# Patient Record
Sex: FEMALE | Race: OTHER | HISPANIC OR LATINO
[De-identification: names, ages, dates, MRNs, and addresses within clinical notes are randomized per-mention and may not be internally consistent; named-entity substitution may affect disease eponyms.]

---
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
## 2023-08-14 ENCOUNTER — OUTPATIENT (OUTPATIENT)
Dept: OUTPATIENT SERVICES | Facility: HOSPITAL | Age: 18
LOS: 1 days | End: 2023-08-14

## 2023-08-14 ENCOUNTER — EMERGENCY (EMERGENCY)
Facility: HOSPITAL | Age: 18
LOS: 1 days | Discharge: HOME | End: 2023-08-14
Attending: STUDENT IN AN ORGANIZED HEALTH CARE EDUCATION/TRAINING PROGRAM
Payer: MEDICAID

## 2023-08-14 VITALS
HEIGHT: 65 IN | WEIGHT: 214.07 LBS | OXYGEN SATURATION: 99 % | DIASTOLIC BLOOD PRESSURE: 82 MMHG | TEMPERATURE: 99 F | HEART RATE: 108 BPM | RESPIRATION RATE: 18 BRPM | SYSTOLIC BLOOD PRESSURE: 132 MMHG

## 2023-08-14 DIAGNOSIS — I10 ESSENTIAL (PRIMARY) HYPERTENSION: ICD-10-CM

## 2023-08-14 DIAGNOSIS — Z90.49 ACQUIRED ABSENCE OF OTHER SPECIFIED PARTS OF DIGESTIVE TRACT: Chronic | ICD-10-CM

## 2023-08-14 LAB — GAS PNL BLDV: SIGNIFICANT CHANGE UP

## 2023-08-14 PROCEDURE — 99291 CRITICAL CARE FIRST HOUR: CPT

## 2023-08-14 RX ORDER — SODIUM CHLORIDE 9 MG/ML
1000 INJECTION INTRAMUSCULAR; INTRAVENOUS; SUBCUTANEOUS ONCE
Refills: 0 | Status: COMPLETED | OUTPATIENT
Start: 2023-08-14 | End: 2023-08-14

## 2023-08-14 RX ADMIN — SODIUM CHLORIDE 1000 MILLILITER(S): 9 INJECTION INTRAMUSCULAR; INTRAVENOUS; SUBCUTANEOUS at 23:59

## 2023-08-15 VITALS
SYSTOLIC BLOOD PRESSURE: 127 MMHG | TEMPERATURE: 98 F | DIASTOLIC BLOOD PRESSURE: 67 MMHG | RESPIRATION RATE: 16 BRPM | HEART RATE: 80 BPM | OXYGEN SATURATION: 100 %

## 2023-08-15 LAB
ALBUMIN SERPL ELPH-MCNC: 4.8 G/DL — SIGNIFICANT CHANGE UP (ref 3.3–5)
ALP SERPL-CCNC: 98 U/L — SIGNIFICANT CHANGE UP (ref 40–120)
ALT FLD-CCNC: 16 U/L — SIGNIFICANT CHANGE UP (ref 10–45)
ANION GAP SERPL CALC-SCNC: 13 MMOL/L — SIGNIFICANT CHANGE UP (ref 5–17)
APPEARANCE UR: CLEAR — SIGNIFICANT CHANGE UP
AST SERPL-CCNC: 13 U/L — SIGNIFICANT CHANGE UP (ref 10–40)
BACTERIA # UR AUTO: NEGATIVE — SIGNIFICANT CHANGE UP
BASE EXCESS BLDV CALC-SCNC: 1 MMOL/L — SIGNIFICANT CHANGE UP (ref -2–3)
BASOPHILS # BLD AUTO: 0.03 K/UL — SIGNIFICANT CHANGE UP (ref 0–0.2)
BASOPHILS NFR BLD AUTO: 0.3 % — SIGNIFICANT CHANGE UP (ref 0–2)
BILIRUB SERPL-MCNC: 0.5 MG/DL — SIGNIFICANT CHANGE UP (ref 0.2–1.2)
BILIRUB UR-MCNC: NEGATIVE — SIGNIFICANT CHANGE UP
BUN SERPL-MCNC: 11 MG/DL — SIGNIFICANT CHANGE UP (ref 7–23)
CA-I SERPL-SCNC: 1.31 MMOL/L — SIGNIFICANT CHANGE UP (ref 1.15–1.33)
CALCIUM SERPL-MCNC: 9.8 MG/DL — SIGNIFICANT CHANGE UP (ref 8.4–10.5)
CHLORIDE BLDV-SCNC: 105 MMOL/L — SIGNIFICANT CHANGE UP (ref 96–108)
CHLORIDE SERPL-SCNC: 104 MMOL/L — SIGNIFICANT CHANGE UP (ref 96–108)
CO2 BLDV-SCNC: 28 MMOL/L — HIGH (ref 22–26)
CO2 SERPL-SCNC: 23 MMOL/L — SIGNIFICANT CHANGE UP (ref 22–31)
COLOR SPEC: SIGNIFICANT CHANGE UP
CREAT SERPL-MCNC: 0.61 MG/DL — SIGNIFICANT CHANGE UP (ref 0.5–1.3)
DIFF PNL FLD: ABNORMAL
EGFR: 132 ML/MIN/1.73M2 — SIGNIFICANT CHANGE UP
EOSINOPHIL # BLD AUTO: 0.1 K/UL — SIGNIFICANT CHANGE UP (ref 0–0.5)
EOSINOPHIL NFR BLD AUTO: 1 % — SIGNIFICANT CHANGE UP (ref 0–6)
EPI CELLS # UR: 0 /HPF — SIGNIFICANT CHANGE UP
GAS PNL BLDV: 140 MMOL/L — SIGNIFICANT CHANGE UP (ref 136–145)
GAS PNL BLDV: SIGNIFICANT CHANGE UP
GLUCOSE BLDV-MCNC: 102 MG/DL — HIGH (ref 70–99)
GLUCOSE SERPL-MCNC: 102 MG/DL — HIGH (ref 70–99)
GLUCOSE UR QL: NEGATIVE — SIGNIFICANT CHANGE UP
HCG SERPL-ACNC: <2 MIU/ML — SIGNIFICANT CHANGE UP
HCO3 BLDV-SCNC: 27 MMOL/L — SIGNIFICANT CHANGE UP (ref 22–29)
HCT VFR BLD CALC: 24.7 % — LOW (ref 34.5–45)
HCT VFR BLDA CALC: 19 % — CRITICAL LOW (ref 34.5–46.5)
HGB BLD CALC-MCNC: 6.4 G/DL — CRITICAL LOW (ref 11.7–16.1)
HGB BLD-MCNC: 6.5 G/DL — CRITICAL LOW (ref 11.5–15.5)
HYALINE CASTS # UR AUTO: 0 /LPF — SIGNIFICANT CHANGE UP (ref 0–2)
IMM GRANULOCYTES NFR BLD AUTO: 0.6 % — SIGNIFICANT CHANGE UP (ref 0–0.9)
KETONES UR-MCNC: NEGATIVE — SIGNIFICANT CHANGE UP
LACTATE BLDV-MCNC: 1.2 MMOL/L — SIGNIFICANT CHANGE UP (ref 0.5–2)
LEUKOCYTE ESTERASE UR-ACNC: NEGATIVE — SIGNIFICANT CHANGE UP
LIDOCAIN IGE QN: 35 U/L — SIGNIFICANT CHANGE UP (ref 7–60)
LYMPHOCYTES # BLD AUTO: 2.48 K/UL — SIGNIFICANT CHANGE UP (ref 1–3.3)
LYMPHOCYTES # BLD AUTO: 25.6 % — SIGNIFICANT CHANGE UP (ref 13–44)
MCHC RBC-ENTMCNC: 14.5 PG — LOW (ref 27–34)
MCHC RBC-ENTMCNC: 26.3 GM/DL — LOW (ref 32–36)
MCV RBC AUTO: 55.1 FL — LOW (ref 80–100)
MONOCYTES # BLD AUTO: 0.65 K/UL — SIGNIFICANT CHANGE UP (ref 0–0.9)
MONOCYTES NFR BLD AUTO: 6.7 % — SIGNIFICANT CHANGE UP (ref 2–14)
NEUTROPHILS # BLD AUTO: 6.35 K/UL — SIGNIFICANT CHANGE UP (ref 1.8–7.4)
NEUTROPHILS NFR BLD AUTO: 65.8 % — SIGNIFICANT CHANGE UP (ref 43–77)
NITRITE UR-MCNC: NEGATIVE — SIGNIFICANT CHANGE UP
NRBC # BLD: 0 /100 WBCS — SIGNIFICANT CHANGE UP (ref 0–0)
PCO2 BLDV: 47 MMHG — HIGH (ref 39–42)
PH BLDV: 7.36 — SIGNIFICANT CHANGE UP (ref 7.32–7.43)
PH UR: 6.5 — SIGNIFICANT CHANGE UP (ref 5–8)
PLATELET # BLD AUTO: 388 K/UL — SIGNIFICANT CHANGE UP (ref 150–400)
PO2 BLDV: 28 MMHG — SIGNIFICANT CHANGE UP (ref 25–45)
POTASSIUM BLDV-SCNC: 4.2 MMOL/L — SIGNIFICANT CHANGE UP (ref 3.5–5.1)
POTASSIUM SERPL-MCNC: 4.1 MMOL/L — SIGNIFICANT CHANGE UP (ref 3.5–5.3)
POTASSIUM SERPL-SCNC: 4.1 MMOL/L — SIGNIFICANT CHANGE UP (ref 3.5–5.3)
PROT SERPL-MCNC: 7.7 G/DL — SIGNIFICANT CHANGE UP (ref 6–8.3)
PROT UR-MCNC: NEGATIVE — SIGNIFICANT CHANGE UP
RBC # BLD: 4.48 M/UL — SIGNIFICANT CHANGE UP (ref 3.8–5.2)
RBC # FLD: 23.2 % — HIGH (ref 10.3–14.5)
RBC CASTS # UR COMP ASSIST: 7 /HPF — HIGH (ref 0–4)
SAO2 % BLDV: 42.5 % — LOW (ref 67–88)
SODIUM SERPL-SCNC: 140 MMOL/L — SIGNIFICANT CHANGE UP (ref 135–145)
SP GR SPEC: 1.02 — SIGNIFICANT CHANGE UP (ref 1.01–1.02)
UROBILINOGEN FLD QL: NEGATIVE — SIGNIFICANT CHANGE UP
WBC # BLD: 9.67 K/UL — SIGNIFICANT CHANGE UP (ref 3.8–10.5)
WBC # FLD AUTO: 9.67 K/UL — SIGNIFICANT CHANGE UP (ref 3.8–10.5)
WBC UR QL: 0 /HPF — SIGNIFICANT CHANGE UP (ref 0–5)

## 2023-08-15 PROCEDURE — 83690 ASSAY OF LIPASE: CPT

## 2023-08-15 PROCEDURE — 86850 RBC ANTIBODY SCREEN: CPT

## 2023-08-15 PROCEDURE — 87086 URINE CULTURE/COLONY COUNT: CPT

## 2023-08-15 PROCEDURE — 86923 COMPATIBILITY TEST ELECTRIC: CPT

## 2023-08-15 PROCEDURE — 84295 ASSAY OF SERUM SODIUM: CPT

## 2023-08-15 PROCEDURE — 84132 ASSAY OF SERUM POTASSIUM: CPT

## 2023-08-15 PROCEDURE — 76856 US EXAM PELVIC COMPLETE: CPT

## 2023-08-15 PROCEDURE — 85014 HEMATOCRIT: CPT

## 2023-08-15 PROCEDURE — 82947 ASSAY GLUCOSE BLOOD QUANT: CPT

## 2023-08-15 PROCEDURE — P9016: CPT

## 2023-08-15 PROCEDURE — 86900 BLOOD TYPING SEROLOGIC ABO: CPT

## 2023-08-15 PROCEDURE — 83605 ASSAY OF LACTIC ACID: CPT

## 2023-08-15 PROCEDURE — 81001 URINALYSIS AUTO W/SCOPE: CPT

## 2023-08-15 PROCEDURE — 84702 CHORIONIC GONADOTROPIN TEST: CPT

## 2023-08-15 PROCEDURE — 93005 ELECTROCARDIOGRAM TRACING: CPT

## 2023-08-15 PROCEDURE — 82803 BLOOD GASES ANY COMBINATION: CPT

## 2023-08-15 PROCEDURE — 85025 COMPLETE CBC W/AUTO DIFF WBC: CPT

## 2023-08-15 PROCEDURE — 86901 BLOOD TYPING SEROLOGIC RH(D): CPT

## 2023-08-15 PROCEDURE — 85018 HEMOGLOBIN: CPT

## 2023-08-15 PROCEDURE — 76856 US EXAM PELVIC COMPLETE: CPT | Mod: 26

## 2023-08-15 PROCEDURE — 82435 ASSAY OF BLOOD CHLORIDE: CPT

## 2023-08-15 PROCEDURE — 80053 COMPREHEN METABOLIC PANEL: CPT

## 2023-08-15 PROCEDURE — 99291 CRITICAL CARE FIRST HOUR: CPT | Mod: 25

## 2023-08-15 PROCEDURE — 82330 ASSAY OF CALCIUM: CPT

## 2023-08-15 NOTE — ED PROVIDER NOTE - PATIENT PORTAL LINK FT
You can access the FollowMyHealth Patient Portal offered by [facility] by registering at the following website: [email or web address]. By joining [employer]’s FollowMyHealth portal, you will also be able to view your health information using other applications (apps) compatible with our system.

## 2023-08-15 NOTE — ED ADULT NURSE NOTE - NSFALLUNIVINTERV_ED_ALL_ED
Bed/Stretcher in lowest position, wheels locked, appropriate side rails in place/Call bell, personal items and telephone in reach/Instruct patient to call for assistance before getting out of bed/chair/stretcher/Non-slip footwear applied when patient is off stretcher/Ennice to call system/Physically safe environment - no spills, clutter or unnecessary equipment/Purposeful proactive rounding/Room/bathroom lighting operational, light cord in reach

## 2023-08-15 NOTE — ED ADULT NURSE NOTE - OBJECTIVE STATEMENT
17y F A&Ox4 C/o Low H&H. Pt states for the last 8 months she has had vaginal bleeding, changing about 5 soiled pads daily. She was unable to see a doctor due to lack of insurance. Yesterday pt had blood work taken with new insured physician and received a call at 5pm with the referral to come to the ED for evaluation due to a Hemoglobin of 6. Additionally, pt states she has been feeling dyspnea on exertion and intermittent mild headaches. No uncontrolled bleeding noted. Denies any Fever/cough/chills, N/V/D/Cp/SOB/Gi/Gu symptoms. No PMH. PSH of appendectomy. VSS

## 2023-08-15 NOTE — CONSULT NOTE ADULT - SUBJECTIVE AND OBJECTIVE BOX
GYN Consult Note    16yo G0 with no significant PMHx presents for abnormal uterine bleeding and symptomatic anemia. Patient reports LMP started 9 months ago and she has bled every day since. The bleeding alternates between light (spotting) and moderate-heavy (changing pad every hour). Abnormal bleeding of this pattern has been going on since menarche at 9 years old. She has felt dizziness/lightheadedness and fatigue off/on (approx once per week) for the past two years in the setting of this bleeding. Patient saw an Ob/Gyn 2 years ago and was prescribed OCPs that she self-discontinued after 4 months due to nausea and report that she continued to bleed daily despite the medication. Patient reports normal pelvic sono at that time. Per chart review patient was seen in the [facility] ED for similar symptoms in  and given pRBC transfusion. TAUS at that time was unremarkable. Patient saw her PCP today and gave this history; labs in the office revealed Hgb 6.6, prompting PCP to send patient to ED. Currently patient denies dizziness, lightheadedness, SOB, CP, palpitations, fatigue. Her bleeding is light today.     OBHx: G0  GYNHx: AUB  PMHx: denies (specifically denies migraines, HTN, VTE)  PSHx: appendectomy  Meds: none  All: NKDA  Social: denies tobacco use      REVIEW OF SYSTEMS  General: denies fevers, chills, tiredness  Skin/Breast: denies breast pain  Respiratory and Thorax: denies shortness of breath, denies cough  Cardiovascular: denies chest pain and denies palpitations  Gastrointestinal: denies abdominal pain, nausea/ vomiting	  Genitourinary: denies dysuria, increased urinary frequency, urgency	  Constitutional, Cardiovascular, Respiratory, Gastrointestinal, Genitourinary, Musculoskeletal and Integumentary review of systems are normal except as noted. 	      Vital Signs Last 24 Hrs  T(C): 36.9 (15 Aug 2023 03:10), Max: 37.2 (14 Aug 2023 18:31)  T(F): 98.4 (15 Aug 2023 03:10), Max: 98.9 (14 Aug 2023 18:31)  HR: 92 (15 Aug 2023 03:10) (92 - 108)  BP: 134/68 (15 Aug 2023 03:10) (125/71 - 147/78)  BP(mean): 95 (15 Aug 2023 00:13) (95 - 95)  RR: 16 (15 Aug 2023 03:10) (16 - 18)  SpO2: 100% (15 Aug 2023 03:10) (99% - 100%)    Parameters below as of 15 Aug 2023 03:10  Patient On (Oxygen Delivery Method): room air        PHYSICAL EXAM:   Gen: NAD   Cardiovascular: Clinically well perfused  Respiratory: Breathing comfortably on RA  Abd: Soft, non-tender, non-distended  Pelvic: PATIENT DECLINES. Per ED resident, minimal blood in vagina and no active bleeding from cervical os on prior exam. Pad examined - 10% saturated after approx 5 hours.  Neuro: AOx3      LABS:                        6.5    9.67  )-----------( 388      ( 14 Aug 2023 23:54 )             24.7     08-14    140  |  104  |  11  ----------------------------<  102<H>  4.1   |  23  |  0.61    Ca    9.8      14 Aug 2023 23:54    TPro  7.7  /  Alb  4.8  /  TBili  0.5  /  DBili  x   /  AST  13  /  ALT  16  /  AlkPhos  98  08-14      Urinalysis Basic - ( 15 Aug 2023 02:58 )    Color: Light Yellow / Appearance: Clear / S.016 / pH: x  Gluc: x / Ketone: Negative  / Bili: Negative / Urobili: Negative   Blood: x / Protein: Negative / Nitrite: Negative   Leuk Esterase: Negative / RBC: 7 /hpf / WBC 0 /HPF   Sq Epi: x / Non Sq Epi: x / Bacteria: Negative        RADIOLOGY & ADDITIONAL STUDIES:  < from: US Pelvis Complete (08.15.23 @ 01:54) >  ACC: [id number] EXAM:  US PELVIC COMPLETE   ORDERED BY: MOHSEN CHRISTIAN     PROCEDURE DATE:  08/15/2023          INTERPRETATION:  CLINICAL INFORMATION: Vaginal bleeding with anemia.   Ongoing vaginal bleeding for 8 months.    LMP: Unknown.    COMPARISON: None available.    TECHNIQUE: Transabdominal pelvic sonogram only. Color and Spectral   Doppler was performed.    FINDINGS:  Uterus: 7.3 cm x 3.1 cm x 3.4 cm. Within normal limits.  Endometrium: 2 mm. Within normal limits.    Right ovary: 3.6 cm x 2.2 cm x 2.0 cm. Within normal limits. Color-flow   is demonstrated to the ovary. 2.5 cm x 1.3 cm lobulated hypoechoic lesion   adjacent to the ovary likely paraovarian cyst.  Left ovary: 5.2 cm x 2.0 cm x 3.2 cm. Within normal limits. Color-flow   demonstrated to the ovary.    Fluid: None.    IMPRESSION:  Endometrium within normal limits.    --- End of Report ---           LIA HERBERT MD; Resident Radiologist  This document has been electronically signed.  ZEINA VASQUEZ MD; Attending Radiologist  This document has been electronically signed. Aug 15 2023  2:15AM    < end of copied text >

## 2023-08-15 NOTE — ED ADULT NURSE NOTE - NS ED NOTE ABUSE RESPONSE YN
INDICATION: Fall with subsequent chest pain.



DISCUSSION: Single portable supine view of the chest was

obtained, comparison 03/11/2015. Median sternotomy is stable.

Scarring within the lingula is stable. Mild elevation of the

right hemidiaphragm. No focal consolidation, pleural fluid, or

pneumothorax.



IMPRESSION:

1. No acute cardiopulmonary process.



Dictated by:



Dictated on workstation # [id number] Yes

## 2023-08-15 NOTE — ED PROVIDER NOTE - CLINICAL SUMMARY MEDICAL DECISION MAKING FREE TEXT BOX
Patient referred by pcp for low H&H. She has had one previous blood transfusion. Will get cbc to check for H&H, and most likely transfuse.  Patient has had vaginal bleeding for the last 8 months, will proceed with a transvaginal ultrasound and ua. Patient referred by pcp for low H&H. She has had one previous blood transfusion. Will get cbc to check for H&H, and most likely transfuse.  Patient has had vaginal bleeding for the last 8 months, will proceed with a transvaginal ultrasound and ua.    pettet attending- see attending attestation for my mdm

## 2023-08-15 NOTE — ED PROVIDER NOTE - PROGRESS NOTE DETAILS
Vivian Guillen PGY1: cbc showed Hgb of 6.5. I unit of blood ordered. Discussed the risk and benefits with patient about blood transfusion and she understood and agreed to proceed.   bedside pelvic exam was performed. No discharge noted, no bumps or lacerations seen on exam. [facility] ED Attending- Patient feels well, tolerating PO. Discussed lab and radiology findings with patient. Patient feels comfortable going home. Gave home care and follow up instructions. Discussed which symptoms to look out for and when to return to the ED for further evaluation. Patient given opportunity to ask questions about their medical condition and had all questions answered.

## 2023-08-15 NOTE — ED PROVIDER NOTE - GENITOURINARY, MLM
vaginal bleeding for the last 8 months bedside pelvic exam was performed. No discharge noted, no bumps or lacerations seen on exam. Chaperone by RN bedside pelvic exam was performed. No discharge noted, no bumps or lacerations seen on exam. Chaperone by BRAYDEN Monahan

## 2023-08-15 NOTE — ED PROVIDER NOTE - NSFOLLOWUPINSTRUCTIONS_ED_ALL_ED_FT
you were seen today for vaginal bleeding.  We gave you two units of blood transfused.  we would like you to follow up with gyn clinic.     Please return if you have any worsening symptoms, vomiting, feeling dizzy, or lightheaded. you were seen today for vaginal bleeding.  We gave you two units of blood transfused.  we would like you to follow up with gyn clinic.     Please return if you have any worsening symptoms, vomiting, feeling dizzy, or lightheaded.    Anemia    Anemia is a condition in which the concentration of red blood cells or hemoglobin in the blood is below normal. Hemoglobin is a substance in red blood cells that carries oxygen to the tissues of the body. Anemia results in not enough oxygen reaching these tissues which can cause symptoms such as weakness, dizziness/lightheadedness, shortness of breath, chest pain, paleness, or nausea. The cause of your anemia may or may not be determined immediately. If your hemoglobin was dangerously low, you may have received a blood transfusion. Usually reactions to transfusions occur immediately but monitor yourself for any fevers, rash, or shortness of breath.    SEEK IMMEDIATE MEDICAL CARE IF YOU HAVE ANY OF THE FOLLOWING SYMPTOMS: extreme weakness/chest pain/shortness of breath, black or bloody stools, vomiting blood, fainting, fever, or any signs of dehydration.

## 2023-08-15 NOTE — ED PROVIDER NOTE - ATTENDING CONTRIBUTION TO CARE
I, Nhan Vee, performed a history and physical exam of the patient and discussed their management with the resident and/or advanced care provider. I reviewed the resident and/or advanced care provider's note and agree with the documented findings and plan of care. I was present and available for all procedures.    16 y/o female with no pmh referred to the ED by her pcp for low H&H. She does not have documentation of the lab results or remembers the name of the physician she saw. Patient states she has very heavy menstrual bleeds, with 3-4 thick pads a day. She also states it is very irregular, she can go 6 months without having it but then can have vaginal bleeding for 8 months straight. Today she is currently bleeding. She has gone through 2 pads today. She also endorses dizziness, lightheadedness. She denies any sob, chest pain, fevers, chills, dysuria, hematuria, or diarrhea.    Well appearing and in NAD, head normal appearing atraumatic, trachea midline, no respiratory distress, lungs cta bilaterally, rrr no murmurs, soft NT ND abdomen, no visible extremity deformities, Alert and oriented, non focal neuro exam, skin warm and dry, normal affect and mood, no leg swelling, calf ttp or jvd    Patient presenting with dysfunctional uterine bleeding otherwise for 8 months acute on chronic otherwise symptomatic anemia with low hemoglobin today in office will evaluate with transvaginal ultrasound screening blood work type and screen transfuse unit no pain to suggest torsion or intra-abdominal surgical pathology otherwise likely needing urgent GYN follow-up versus [facility]ynn consultation in ER for hormone initiation for further bleeding control versus outpatient surgical management discussed patient agreeable plan unlikely ACS PE pneumothorax dissection AAA pneumonia

## 2023-08-15 NOTE — CONSULT NOTE ADULT - ASSESSMENT
A/P: 17y yo G0 presenting with symptomatic anemia in the setting of AUB of unclear etiology. Differential includes anovulatory cycles, bleeding disorders, PCOS. Less likely structural etiology given ultrasound findings unremarkable. Patient is currently hemodynamically stable and receiving 1u pRBC in the ED.    Recommendations:  - No acute GYN intervention  - Recommend 2u pRBC  - Note patient denies contraindications to Estrogen (migraines w/ aura, VTE, HTN, tobacco use), however elevated BPs noted in ED today  - Outpatient follow-up with Ob/Gyn clinic to establish care and discuss long-term management plan, e.g. IUD, DMPA, birth control pills  [address], [address]  [place] [address]  [phone]    D/w Dr. Alireza Thompson PGY2

## 2023-08-15 NOTE — ED PROVIDER NOTE - OBJECTIVE STATEMENT
16 y/o female with no pmh referred to the ED by her pcp for low H&H. She does not have documentation of the lab results or remembers the name of the physician she saw. Patient states she has very heavy menstrual bleeds, with 3-4 thick pads a day. She also states it is very irregular, she can go 6 months without having it but then can have vaginal bleeding for 8 months straight. Today she is currently bleeding. She has gone through 2 pads today. She also endorses dizziness, lightheadedness. She denies any sob, chest pain, fevers, chills, dysuria, hematuria, or diarrhea.

## 2023-08-15 NOTE — ED PROVIDER NOTE - NSFOLLOWUPCLINICS_GEN_ALL_ED_FT
[facility]st[facility]  Gynceology/OB  [address] [place] [address]  Phone: [phone]  Fax:   Follow Up Time: Urgent

## 2023-08-16 LAB
CULTURE RESULTS: SIGNIFICANT CHANGE UP
SPECIMEN SOURCE: SIGNIFICANT CHANGE UP

## 2024-09-13 NOTE — ED PROVIDER NOTE - NSSUBSTANCEUSE_GEN_ALL_CORE_SD
Called pt to convey positive strep throat. VML w/ results and call back #        Please send abx tx to preferred pharmacy from visit.    never used

## 2024-09-30 NOTE — ED ADULT NURSE NOTE - CADM POA PRESS ULCER
Health Maintenance       Abdominal Aortic Aneurysm (AAA) Screening (Once)  Never done    COVID-19 Vaccine (6 - 2023-24 season)  Overdue since 9/1/2024    Influenza Vaccine (1)  Order placed this encounter           Following review of the above:  Pended orders  Patient is not proceeding with: Abdominal Aortic Aneurysm (AAA) screening and COVID-19    Note: Refer to final orders and clinician documentation.       No

## 2024-10-14 NOTE — CONSULT NOTE PEDS - ATTENDING COMMENTS
no 13y/o G0, LNMP 5 months ago, h/o AUB, with symptomatic anemia. Patient is status post transfusion of 1u pRBC, and had an appropriate increased in H/H from 5.6/19.6 to 6.8->23.2. Symptoms of anemia have resolved and patient is not actively bleeding.  Advise transfusing another unit of PRBC's, advise starting OCP's (Junel Fe), one pill daily for cycle control and outpatient f/u.